# Patient Record
Sex: FEMALE | Race: BLACK OR AFRICAN AMERICAN
[De-identification: names, ages, dates, MRNs, and addresses within clinical notes are randomized per-mention and may not be internally consistent; named-entity substitution may affect disease eponyms.]

---

## 2017-01-25 ENCOUNTER — HOSPITAL ENCOUNTER (EMERGENCY)
Dept: HOSPITAL 25 - ED | Age: 22
Discharge: HOME | End: 2017-01-25
Payer: COMMERCIAL

## 2017-01-25 VITALS — DIASTOLIC BLOOD PRESSURE: 75 MMHG | SYSTOLIC BLOOD PRESSURE: 118 MMHG

## 2017-01-25 DIAGNOSIS — R10.9: ICD-10-CM

## 2017-01-25 DIAGNOSIS — R11.10: ICD-10-CM

## 2017-01-25 DIAGNOSIS — K52.9: Primary | ICD-10-CM

## 2017-01-25 DIAGNOSIS — R19.7: ICD-10-CM

## 2017-01-25 DIAGNOSIS — K59.00: ICD-10-CM

## 2017-01-25 LAB
ADD DIFF/SLIDE REVIEW?: (no result)
ALBUMIN SERPL BCG-MCNC: 4.8 G/DL (ref 3.2–5.2)
ALP SERPL-CCNC: 60 U/L (ref 34–104)
ALT SERPL W P-5'-P-CCNC: 20 U/L (ref 7–52)
ANION GAP SERPL CALC-SCNC: 6 MMOL/L (ref 2–11)
AST SERPL-CCNC: 20 U/L (ref 13–39)
BUN SERPL-MCNC: 13 MG/DL (ref 6–24)
BUN/CREAT SERPL: 21.3 (ref 8–20)
CALCIUM SERPL-MCNC: 9.8 MG/DL (ref 8.6–10.3)
CHLORIDE SERPL-SCNC: 106 MMOL/L (ref 101–111)
GLOBULIN SER CALC-MCNC: 3.1 G/DL (ref 2–4)
GLUCOSE SERPL-MCNC: 96 MG/DL (ref 70–100)
HCO3 SERPL-SCNC: 27 MMOL/L (ref 22–32)
HCT VFR BLD AUTO: 45 % (ref 35–47)
HGB BLD-MCNC: 14.6 G/DL (ref 12–16)
LIPASE SERPL-CCNC: 10 U/L (ref 11–82)
MCH RBC QN AUTO: 27 PG (ref 27–31)
MCHC RBC AUTO-ENTMCNC: 33 G/DL (ref 31–36)
MCV RBC AUTO: 83 FL (ref 80–97)
POTASSIUM SERPL-SCNC: 3.9 MMOL/L (ref 3.5–5)
PROT SERPL-MCNC: 7.9 G/DL (ref 6.4–8.9)
RBC # BLD AUTO: 5.43 10^6/UL (ref 4–5.4)
SODIUM SERPL-SCNC: 139 MMOL/L (ref 133–145)
WBC # BLD AUTO: 10.3 10^3/UL (ref 3.5–10.8)

## 2017-01-25 PROCEDURE — 96375 TX/PRO/DX INJ NEW DRUG ADDON: CPT

## 2017-01-25 PROCEDURE — 85025 COMPLETE CBC W/AUTO DIFF WBC: CPT

## 2017-01-25 PROCEDURE — 83690 ASSAY OF LIPASE: CPT

## 2017-01-25 PROCEDURE — 87086 URINE CULTURE/COLONY COUNT: CPT

## 2017-01-25 PROCEDURE — 74177 CT ABD & PELVIS W/CONTRAST: CPT

## 2017-01-25 PROCEDURE — 81015 MICROSCOPIC EXAM OF URINE: CPT

## 2017-01-25 PROCEDURE — 81003 URINALYSIS AUTO W/O SCOPE: CPT

## 2017-01-25 PROCEDURE — 99282 EMERGENCY DEPT VISIT SF MDM: CPT

## 2017-01-25 PROCEDURE — 80053 COMPREHEN METABOLIC PANEL: CPT

## 2017-01-25 PROCEDURE — 84702 CHORIONIC GONADOTROPIN TEST: CPT

## 2017-01-25 PROCEDURE — 36415 COLL VENOUS BLD VENIPUNCTURE: CPT

## 2017-01-25 PROCEDURE — 96374 THER/PROPH/DIAG INJ IV PUSH: CPT

## 2017-01-25 NOTE — RAD
CLINICAL HISTORY: Diverticulitis, nausea vomiting and diarrhea



COMPARISON: None



TECHNIQUE: Multiple contiguous axial CT scans were obtained of the abdomen and pelvis

after the administration of intravenous contrast. Coronal and sagittal multiplanar

reformations are submitted for review.  Oral contrast was not administered.  Delayed

images were obtained through the abdomen



FINDINGS:    



LUNG BASES: The lung bases are clear.



LIVER: The liver is normal in shape, size, contour, and attenuation.

BILE DUCTS: There is no intrahepatic or extrahepatic biliary dilatation.

GALLBLADDER: The gallbladder is normal, without pericholecystic inflammatory change.



PANCREAS: The pancreas is normal, without mass or ductal dilatation.

SPLEEN: Normal in size and appearance.



UPPER GI TRACT: Evaluation of the gastrointestinal tract is limited by incomplete gastric

distention. The upper GI tract is unremarkable.

SMALL BOWEL AND MESENTERY: The small bowel is normal in contour, course, and caliber.

There is no obstruction or dilatation.

COLON: The colon is normal in contour, course, caliber. There is no pericolonic

inflammatory change.



ADRENALS: Normal bilaterally.

KIDNEYS: The kidneys are normal in shape, size, contour, and axis. There is no

hydronephrosis or nephrolithiasis.

BLADDER: The bladder is smooth in contour.



PELVIC ORGANS: There is enlargement of the gonadal veins bilaterally with prominence of

the vasculature along the broad ligaments bilaterally.



AORTA: The aorta is normal.

IVC: Unremarkable



LYMPH NODES: There is no lymphadenopathy by size criteria.



ABDOMINAL WALL: There is no evidence for abdominal wall hernia.

BONES AND SOFT TISSUES: Unremarkable

OTHER: None



IMPRESSION:

1.  NO OBSTRUCTION.

2.  PROMINENCE OF THE VASCULATURE ALONG THE BROAD LIGAMENTS BILATERALLY WITH ENLARGEMENT

OF THE GONADAL VEINS. WHILE NONSPECIFIC, THIS CAN BE SEEN IN ASSOCIATION WITH PELVIC

CONGESTION SYNDROME

## 2017-01-25 NOTE — ED
Julia PORTILLO Rebecca, scribed for Lui Fountainuel on 01/25/17 at 0243 .





Abdominal Pain/Female





- HPI Summary


HPI Summary: 


Pt is a 22 y/o F who presents to ED c/o abd pain. Pain began suddenly 3 hours 

ago (2300) and has been constant since onset. Pain is diffuse without radiation

, is characterized as aching and currently ranked 4/10. Sx aggravated and 

alleviated by nothing. Additionally c/o V/D since 2300, quoting 4 episodes of 

vomiting and countless bouts of diarrhea. Additionally c/o fever, stating 

recent "hot flashes" and constipation for weeks prior to current sx. PMHx "bad 

digestive system." 








- History of Current Complaint


Chief Complaint: EDNauseaVomitDiarrh


Stated Complaint: FLU LIKE SYMPTOMS


Hx Obtained From: Patient


Hx Last Menstrual Period: LAST WEEK IN JULY 2015


Onset/Duration: Sudden Onset, Lasting Hours - 3 hours, Still Present


Timing: Constant


Severity Initially: Moderate


Severity Currently: Moderate


Pain Intensity: 4


Pain Scale Used: 0-10 Numeric


Location: Diffuse


Radiates: No


Character: Dull


Aggravating Factor(s): Nothing


Alleviating Factor(s): Nothing


Associated Signs and Symptoms: Positive: Fever, Constipation, Vomiting, Diarrhea


Allergies/Adverse Reactions: 


 Allergies











Allergy/AdvReac Type Severity Reaction Status Date / Time


 


Amoxicillin [From Augmentin] Allergy Unknown Unknown Verified 09/07/15 19:35





   Reaction  





   Details  


 


Clavulanic Acid Allergy Unknown Unknown Verified 09/07/15 19:35





[From Augmentin]   Reaction  





   Details  














PMH/Surg Hx/FS Hx/Imm Hx


Endocrine/Hematology History: 


   Denies: Hx Diabetes


Cardiovascular History: 


   Denies: Hx Hypertension


GI History: Reports: Other GI Disorders - "bad digestive system"


Infectious Disease History: No


Infectious Disease History: 


   Denies: Traveled Outside the [place] in Last 30 Days





- Family History


Known Family History: 


   Negative: Hypertension, Diabetes





- Social History


Alcohol Use: None


Substance Use Type: Reports: None


Smoking Status (MU): Never Smoked Tobacco





Review of Systems


Positive: Fever - "hot flashes"


Positive: Abdominal Pain - diffuse, Vomiting - 4x, Diarrhea, Other - 

Constipation for weeks prior to current sx 


All Other Systems Reviewed And Are Negative: Yes





Physical Exam


Triage Information Reviewed: Yes


Vital Signs On Initial Exam: 


 Initial Vitals











Temp Pulse Resp BP Pulse Ox


 


 98.3 F   92   14   118/75   100 


 


 01/25/17 02:30  01/25/17 02:30  01/25/17 02:30  01/25/17 02:30  01/25/17 02:30











Vital Signs Reviewed: Yes


Appearance: Positive: Well-Appearing, No Pain Distress


Skin: Positive: Warm, Skin Color Reflects Adequate Perfusion, Dry


Head/Face: Positive: Normal Head/Face Inspection


Eyes: Positive: EOMI, KAYY


ENT: Positive: Normal ENT inspection


Neck: Positive: Supple, Nontender


Respiratory/Lung Sounds: Positive: Clear to Auscultation, Breath Sounds Present


Cardiovascular: Positive: RRR, Pulses are Symmetrical in both Upper and Lower 

Extremities


Abdomen Description: Positive: Soft.  Negative: Nontender - Diffuse tenderness 

of the abdomen


Bowel Sounds: Positive: Present


Musculoskeletal: Positive: Normal, Strength/ROM Intact


Neurological: Positive: Normal, Sensory/Motor Intact, Alert, Oriented to Person 

Place, Time





Diagnostics





- Vital Signs


 Vital Signs











  Temp Pulse Resp BP Pulse Ox


 


 01/25/17 02:30  98.3 F  92  14  118/75  100














- Laboratory


Result Diagrams: 


 01/25/17 03:30





 01/25/17 03:30


Lab Statement: Any lab studies that have been ordered have been reviewed, and 

results considered in the medical decision making process.





- CT


  ** CT Abd/Pel


CT Interpretation: No Acute Changes - No inflammatory process identified in the 

abdomen or pelvis. No abdominal mass, adenopathy or collection seen.


CT Interpretation Completed By: Radiologist





Abdominal Pain Fem Course/Dx





- Course


Course Of Treatment: Pt is a 22 y/o F who presents to ED with a CC of V/D for 3 

hours. Additionally c/o fevers/"hot flashes" and constipation (for weeks prior 

to tonight's sx). CT Abd/Pel reveals no acute abdominal pathology. Pt will be d/

c to home with a dx of gastroenteritis and a followup with her PCP.





- Diagnoses


Provider Diagnoses: 


 Gastroenteritis








Discharge





- Discharge Plan


Condition: Stable


Disposition: HOME


Prescriptions: 


Ondansetron [Zofran Odt] 4 mg PO TID #20 tab


Patient Education Materials:  Gastroenteritis (ED)


Referrals: 


CRYS Hicks [Primary Care Provider] - 3 Days (Follow up with your primary 

care physician within the next 3 days. )





The documentation as recorded by the Julia colin Rebecca accurately 

reflects the service I personally performed and the decisions made by me, Bartolo Fountain.

## 2017-09-06 ENCOUNTER — HOSPITAL ENCOUNTER (EMERGENCY)
Dept: HOSPITAL 25 - UCCORT | Age: 22
Discharge: HOME | End: 2017-09-06
Payer: COMMERCIAL

## 2017-09-06 VITALS — DIASTOLIC BLOOD PRESSURE: 69 MMHG | SYSTOLIC BLOOD PRESSURE: 122 MMHG

## 2017-09-06 DIAGNOSIS — F32.9: ICD-10-CM

## 2017-09-06 DIAGNOSIS — Z88.3: ICD-10-CM

## 2017-09-06 DIAGNOSIS — J02.8: Primary | ICD-10-CM

## 2017-09-06 PROCEDURE — 99212 OFFICE O/P EST SF 10 MIN: CPT

## 2017-09-06 PROCEDURE — 87651 STREP A DNA AMP PROBE: CPT

## 2017-09-06 PROCEDURE — G0463 HOSPITAL OUTPT CLINIC VISIT: HCPCS

## 2017-09-06 NOTE — UC
Throat Pain/Nasal Jalen HPI





- HPI Summary


HPI Summary: 





22 y/o female presents to the urgent care c/o sore throat, nasal congestion, 

productive cough and ear popping since yesterday.  Pt states difficulty 

swallowing, pain is 7/10. She has decrease appetite with subjective fever at 

home. She has not taking anything to alleviate symptoms. Pt denies SOB, chest 

pain, N/V/D








- History of Current Complaint


Chief Complaint: UCRespiratory


Stated Complaint: CONGESTION/SORE THROAT


Time Seen by Provider: 09/06/17 21:34


Hx Obtained From: Patient


Hx Last Menstrual Period: LAST WEEK IN JULY 2015


Pregnant?: No


Onset/Duration: Gradual Onset, Lasting Days, Still Present


Severity: Moderate


Pain Intensity: 7


Pain Scale Used: 0-10 Numeric


Cough: Productive - clear phlegm


Associated Signs & Symptoms: Positive: Dysphagia, Fever - subjective at home





- Epiglottits Risk Factors


Epiglottis Risk Factors: Negative





- Allergies/Home Medications


Allergies/Adverse Reactions: 


 Allergies











Allergy/AdvReac Type Severity Reaction Status Date / Time


 


Amoxicillin [From Augmentin] Allergy Unknown Unknown Verified 09/06/17 20:29





   Reaction  





   Details  


 


Clavulanic Acid Allergy Unknown Unknown Verified 09/06/17 20:29





[From Augmentin]   Reaction  





   Details  











Home Medications: 


 Home Medications





ARIPiprazole TAB* [Abilify  TAB*] 5 mg PO DAILY 09/06/17 [History Confirmed 09/ 06/17]


Escitalopram Oxalate [Lexapro 10 mg] 10 mg PO DAILY 09/06/17 [History Confirmed 

09/06/17]











PMH/Surg Hx/FS Hx/Imm Hx


Previously Healthy: Yes


Psychological History: Depression





- Surgical History


Surgical History: None





- Family History


Known Family History: 


   Negative: Hypertension, Diabetes


Family History: Asthma





- Social History


Occupation: Employed Full-time


Lives: With Family


Alcohol Use: None


Substance Use Type: None


Smoking Status (MU): Never Smoked Tobacco


Household Exposure Type: Cigarettes





- Immunization History


Most Recent Influenza Vaccination: Not the 2014/2015 Season


Vaccination Up to Date: Yes





Review of Systems


Constitutional: Fever - subjective at home


Skin: Negative


Eyes: Negative


ENT: Sore Throat, Nasal Discharge - clear discharge


Respiratory: Cough - productive


Cardiovascular: Negative


Gastrointestinal: Negative


Genitourinary: Negative


Motor: Negative


Neurovascular: Negative


Musculoskeletal: Negative


Neurological: Negative


Psychological: Negative


Is Patient Immunocompromised?: No


All Other Systems Reviewed And Are Negative: Yes





Physical Exam


Triage Information Reviewed: Yes


Appearance: Well-Appearing, No Pain Distress, Well-Nourished


Vital Signs: 


 Initial Vital Signs











Temp  100.4 F   09/06/17 20:23


 


Pulse  87   09/06/17 20:23


 


Resp  17   09/06/17 20:23


 


BP  122/69   09/06/17 20:23


 


Pulse Ox  100   09/06/17 20:23











Vital Signs Reviewed: Yes


Eye Exam: Normal


Eyes: Positive: Conjunctiva Clear - PERRLA, EOMI


ENT: Positive: Normal ENT inspection, Hearing grossly normal, Pharyngeal 

erythema, TMs normal, Tonsillar swelling.  Negative: Nasal congestion, Nasal 

drainage, Tonsillar exudate


Neck exam: Normal


Neck: Positive: Supple, Nontender, No Lymphadenopathy


Respiratory Exam: Normal


Respiratory: Positive: Chest non-tender, Lungs clear, Normal breath sounds


Cardiovascular Exam: Normal


Cardiovascular: Positive: RRR, No Murmur, Pulses Normal


Abdominal Exam: Normal


Abdomen Description: Positive: Nontender, No Organomegaly, Soft.  Negative: CVA 

Tenderness (R), CVA Tenderness (L)


Bowel Sounds: Positive: Present


Musculoskeletal Exam: Normal


Musculoskeletal: Positive: Strength Intact, ROM Intact, No Edema


Neurological Exam: Normal


Psychological Exam: Normal


Skin Exam: Normal





Throat Pain/Nasal Course/Dx





- Course


Course Of Treatment: 22 y/o female presents to the urgent care c/o sore throat, 

nasal congestion, productive cough and ear popping since yesterday.  Pt states 

difficulty swallowing, pain is 7/10. She has decrease appetite with subjective 

fever at home. She has not taking anything to alleviate symptoms. Pt denies SOB

, chest pain, N/V/D. Rapid strep: negative. Pt with a Viral pharyngitis. Pt Rx 

ibuprofen PO to alleviates symptoms of pain and swelling. Increse fluid intake,

and rest. Advised on hand washing to avoid spreading. If symptoms do not 

improve or worsen advised to return to the urgent care or f/u with her PCP for 

further evaluation and treatment. Pt understood and agreed





- Differential Dx/Diagnosis


Differential Diagnosis/HQI/PQRI: Laryngitis, Mononucleosis, Otitis Media, 

Pharyngitis, Sinusitis, Tonsillitis


Provider Diagnoses: 1- Viral pharyngitis





Discharge





- Discharge Plan


Condition: Stable


Disposition: HOME


Prescriptions: 


Ibuprofen TAB* [Motrin TAB* 800 MG] 800 mg PO Q6H #20 tab


Patient Education Materials:  Pharyngitis (ED)


Forms:  *Work Release


Referrals: 


Josefa Figueroa MD [Primary Care Provider] - If Needed


Additional Instructions: 


1-Please take ibuprofen PO q6-8hrs prn as instructed after meals to alleviate 

pain and swelling.Increase fluid intake,eat well and rest


2-If symptoms do not improve or worsen please return to the urgent care or f/u 

with your PCP for further evaluation and treatment.

## 2017-10-04 ENCOUNTER — HOSPITAL ENCOUNTER (EMERGENCY)
Dept: HOSPITAL 25 - UCCORT | Age: 22
Discharge: HOME | End: 2017-10-04
Payer: COMMERCIAL

## 2017-10-04 VITALS — DIASTOLIC BLOOD PRESSURE: 73 MMHG | SYSTOLIC BLOOD PRESSURE: 117 MMHG

## 2017-10-04 DIAGNOSIS — H00.015: ICD-10-CM

## 2017-10-04 DIAGNOSIS — B00.9: Primary | ICD-10-CM

## 2017-10-04 DIAGNOSIS — Z88.1: ICD-10-CM

## 2017-10-04 PROCEDURE — 99212 OFFICE O/P EST SF 10 MIN: CPT

## 2017-10-04 PROCEDURE — G0463 HOSPITAL OUTPT CLINIC VISIT: HCPCS

## 2017-10-04 NOTE — UC
Skin Complaint HPI





- HPI Summary


HPI Summary: 





Patient presents to the  with CC of small lesion to the lower lip.  She 

states she has a history of herpes type II and has not had an outbreak for over 

2 years.  She thought she bit her lip and has used benadryl 50mg and ice to the 

area x 2 days without relief.  She also notes to a small red bump under the 

left lower medial lid of the eye resembling a stye.  She has been stressed 

recently and states usually she has an out break when she is stressed.  There 

is a small ulcerative lesion on the inside of the lower lip just behind the 

outer lip lesion resembling a aphthous stomatitis. She denies HA, abdominal pain

, fevers, sweats or chills. Otherwise healthy and takes no medications.  





- History of Current Complaint


Chief Complaint: UCAllergicReaction


Time Seen by Provider: 10/04/17 09:58


Stated Complaint: ALLERGIC REACTION


Hx Obtained From: Patient


Hx Last Menstrual Period: 10/2/17


Pregnant?: No


Onset/Duration: Sudden Onset


Skin Exposure Onset/Duration: Days Ago


Timing: Constant


Onset Severity: Moderate


Current Severity: Moderate


Pain Intensity: 2


Pain Scale Used: 0-10 Numeric


Location: Face


Character: Swelling, Redness, Raised


Aggravating Factor(s): Touch


Alleviating Factor(s): Cold Compresses


Associated Signs & Symptoms: Positive: Negative





- Allergy/Home Medications


Allergies/Adverse Reactions: 


 Allergies











Allergy/AdvReac Type Severity Reaction Status Date / Time


 


Amoxicillin [From Augmentin] Allergy Unknown Unknown Verified 10/04/17 09:51





   Reaction  





   Details  


 


Clavulanic Acid Allergy Unknown Unknown Verified 10/04/17 09:51





[From Augmentin]   Reaction  





   Details  











Home Medications: 


 Home Medications





diPHENhydraMINE PO* [Benadryl PO 25 MG TAB*] 50 mg PO Q6H PRN 10/04/17 [History 

Confirmed 10/04/17]


hydrOXYzine HCL TAB* [Atarax 10 MG TAB*] 10 mg PO DAILY 10/04/17 [History 

Confirmed 10/04/17]











Review of Systems


Constitutional: Negative


Skin: Other - lesion just inferior to the right lower lip with 2 small vesicles


Eyes: Negative


Respiratory: Negative


Cardiovascular: Negative


Motor: Negative


Neurovascular: Negative


Neurological: Negative


Psychological: Negative


Is Patient Immunocompromised?: No


All Other Systems Reviewed And Are Negative: Yes





PMH/Surg Hx/FS Hx/Imm Hx


Previously Healthy: Yes





- Surgical History


Surgical History: None





- Family History


Known Family History: 


   Negative: Hypertension, Diabetes


Family History: Asthma





- Social History


Occupation: Employed Part-time


Lives: With Family


Alcohol Use: None


Substance Use Type: None


Smoking Status (MU): Never Smoked Tobacco


Household Exposure Type: Cigarettes





- Immunization History


Most Recent Influenza Vaccination: Not the 2014/2015 Season


Vaccination Up to Date: Yes





Physical Exam


Triage Information Reviewed: Yes


Appearance: Well-Appearing, Well-Nourished


Vital Signs: 


 Initial Vital Signs











Temp  98.6 F   10/04/17 09:54


 


Pulse  76   10/04/17 09:54


 


Resp  18   10/04/17 09:54


 


BP  117/73   10/04/17 09:54


 


Pulse Ox  100   10/04/17 09:54











Vital Signs Reviewed: Yes


Eye Exam: Normal


Eyes: Positive: Conjunctiva Clear, Other: - small lesion to the medial left 

lower eyelid resembling a stye - painful to touch - .1cm


Neck exam: Normal


Neck: Positive: Supple, No Lymphadenopathy


Respiratory Exam: Normal


Respiratory: Positive: Chest non-tender, Lungs clear


Cardiovascular Exam: Normal


Cardiovascular: Positive: RRR


Musculoskeletal Exam: Normal


Musculoskeletal: Positive: Strength Intact


Neurological Exam: Normal


Psychological Exam: Normal


Psychological: Positive: Normal Response To Family


Skin: Positive: significant lesion(s) - lesion just inferior to the right lower 

lip





Course/Dx





- Course


Course Of Treatment: Patient is evaluated for a lesion just inferior to the 

right lower lip with 2 small vesicles and small lesion to the medial left lower 

eyelid resembling a stye - painful to touch - .1cm.  She is given Valacyclovir 

for herpes simplex of the oral cavity and parameters to try to improve the 

stye.  Will defer at this time any optho abx d/t size and recent appearance 

yesterday. Likely will improve with warm compresses.  Given instructions to 

take Lysine 1000mg daily to prevent further outbreaks, use ice and tea tree 

oil.  Given rx for abreva and valacyclovir 1000mg TID x 7 days for outbreak.  

She is OK with plan and will return for any worsening symptoms.





- Differential Diagnoses - Skin Complaint


Differential Diagnoses: Allergic Reaction, Impetigo, Urticaria





- Diagnoses


Provider Diagnoses: Herpes Simplex/ Stye





Discharge





- Discharge Plan


Condition: Stable


Disposition: HOME


Prescriptions: 


Docosanol 10%* [Abreva 10%*] 1 applic TOPICAL 5ID #1 cre


ValACYclovir (*) [Valtrex 1 GM(*)] 1 gm PO DAILY #21 tab


Patient Education Materials:  Genital Herpes Simplex (ED), Stye (ED)


Referrals: 


Josefa Figueroa MD [Primary Care Provider] - 


Additional Instructions: 


warm compresses to the eye 3-4 times per day


tea tree oil, abreva (5 x daily) - rub in very well and ice to the lip


Lysine 1000mg once daily, when having an outbreak - 6000mg once daily


Valacyclovir 1000mg three times daily for 7 days





Images


Head: 


  __________________________














  __________________________





 1 - lesion just inferior to the right lower lip





 2 - small .1 cm painful red lesion without surrounding erythema or eye 

involvement

## 2018-01-10 ENCOUNTER — HOSPITAL ENCOUNTER (EMERGENCY)
Dept: HOSPITAL 25 - UCCORT | Age: 23
Discharge: HOME | End: 2018-01-10
Payer: COMMERCIAL

## 2018-01-10 VITALS — DIASTOLIC BLOOD PRESSURE: 74 MMHG | SYSTOLIC BLOOD PRESSURE: 123 MMHG

## 2018-01-10 DIAGNOSIS — Z77.22: ICD-10-CM
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
## 2018-01-10 DIAGNOSIS — Z88.1: ICD-10-CM
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
## 2018-01-10 DIAGNOSIS — S00.521A: Primary | ICD-10-CM

## 2018-01-10 PROCEDURE — 99212 OFFICE O/P EST SF 10 MIN: CPT

## 2018-01-10 PROCEDURE — G0463 HOSPITAL OUTPT CLINIC VISIT: HCPCS

## 2018-01-10 NOTE — UC
Skin Complaint HPI





- HPI Summary


HPI Summary: 





22 year old female presents with oral lower lip blister. 





- History of Current Complaint


Time Seen by Provider: 01/10/18 16:38


Stated Complaint: LIP COMPLAINT


Hx Obtained From: Patient


Hx Last Menstrual Period: LAST WEEK IN JULY 2015


Onset/Duration: Sudden Onset


Skin Exposure Onset/Duration: Hours Ago


Onset Severity: Moderate


Current Severity: Moderate


Pain Scale Used: 0-10 Numeric - 2


Location: Discrete, Face


Aggravating Factor(s): Wind


Alleviating Factor(s): Nothing


Associated Signs & Symptoms: Positive: Negative





- Allergy/Home Medications


Allergies/Adverse Reactions: 


 Allergies











Allergy/AdvReac Type Severity Reaction Status Date / Time


 


Amoxicillin [From Augmentin] Allergy Unknown Unknown Verified 01/10/18 16:33





   Reaction  





   Details  


 


Clavulanic Acid Allergy Unknown Unknown Verified 01/10/18 16:33





[From Augmentin]   Reaction  





   Details  











Home Medications: 


 Home Medications





Nuva Ring  01/10/18 [History]











Review of Systems


Constitutional: Negative


Skin: Negative


Eyes: Negative


ENT: Other - lower lip blister


Respiratory: Negative


Cardiovascular: Negative


Gastrointestinal: Negative


Genitourinary: Negative


Motor: Negative


Neurovascular: Negative


Musculoskeletal: Negative


Neurological: Negative


Psychological: Negative


All Other Systems Reviewed And Are Negative: Yes





PMH/Surg Hx/FS Hx/Imm Hx


Previously Healthy: Yes





- Surgical History


Surgical History: None





- Family History


Known Family History: 


   Negative: Hypertension, Diabetes


Family History: Asthma





- Social History


Alcohol Use: None


Substance Use Type: None


Smoking Status (MU): Never Smoked Tobacco


Household Exposure Type: Cigarettes





- Immunization History


Most Recent Influenza Vaccination: Not the 2014/2015 Season


Vaccination Up to Date: Yes





Physical Exam


Triage Information Reviewed: Yes


Vital Signs Reviewed: Yes


Eye Exam: Normal


ENT: Positive: Other - lower lip blister


Dental Exam: Normal


Neck exam: Normal


Neck: Positive: 1


Respiratory Exam: Normal


Cardiovascular Exam: Normal


Abdominal Exam: Normal


Musculoskeletal Exam: Normal


Neurological Exam: Normal


Psychological Exam: Normal


Skin Exam: Normal





Course/Dx





- Diagnoses


Provider Diagnoses: blister lower lip





Discharge





- Discharge Plan


Condition: Stable


Disposition: HOME


Prescriptions: 


Acyclovir* [Zovirax 200 MG CAP*] 200 mg PO 5ID #35 cap


LoraTADine TAB(NF) [Claritin 10 MG TAB(NF)] 10 mg PO DAILY #30 tab


Methylprednisolone [Medrol Dosepak 4 MG*] 4 mg PO .SEE ARNIE INSTRUCTION #21 tab


Patient Education Materials:  Blister (ED)


Referrals: 


No Primary Care Phys,NOPCP [Primary Care Provider] -

## 2018-01-22 ENCOUNTER — HOSPITAL ENCOUNTER (EMERGENCY)
Dept: HOSPITAL 25 - UCCORT | Age: 23
Discharge: HOME | End: 2018-01-22
Payer: COMMERCIAL

## 2018-01-22 VITALS — SYSTOLIC BLOOD PRESSURE: 121 MMHG | DIASTOLIC BLOOD PRESSURE: 82 MMHG

## 2018-01-22 DIAGNOSIS — K64.9: ICD-10-CM

## 2018-01-22 DIAGNOSIS — K59.00: Primary | ICD-10-CM

## 2018-01-22 PROCEDURE — G0463 HOSPITAL OUTPT CLINIC VISIT: HCPCS

## 2018-01-22 PROCEDURE — 99211 OFF/OP EST MAY X REQ PHY/QHP: CPT

## 2018-01-22 NOTE — UC
Rectal Pain HPI





- HPI Summary


HPI Summary: 





Pt c/o BRBPR this morning with BM. Pt has history of hemorrhoids and 

constipation.  Pt states she has been constipated X 5 days. 





- History Of Current Complaint


Chief Complaint: UCGeneralIllness


Stated Complaint: PERSONAL


Time Seen by Provider: 01/22/18 08:41


Hx Obtained From: Patient


Hx Last Menstrual Period: 12/25/17


Pregnant?: No


Onset/Duration: Gradual Onset


Severity Initially: Mild


Severity Currently: Mild


Pain Intensity: 7


Pain Scale Used: 0-10 Numeric


Location Of Pain: Anal


Character: Sharp, Burning


Aggravating Factor(s): Bowel Movement


Alleviating Factor(s): Hemorroidal Meds - Tucks OTC


Associated Signs And Symptoms: Positive: Rectal Bleeding, Bright Red Blood w/

Stool, Constipation


Related History: Similar Episode/Dx As - hemorrhoids and constipation, 

Hemorrhoids





- Allergies/Home Medications


Allergies/Adverse Reactions: 


 Allergies











Allergy/AdvReac Type Severity Reaction Status Date / Time


 


Amoxicillin [From Augmentin] Allergy Unknown Unknown Verified 01/22/18 09:00





   Reaction  





   Details  


 


Clavulanic Acid Allergy Unknown Unknown Verified 01/22/18 09:00





[From Augmentin]   Reaction  





   Details  


 


Acyclovir Allergy  See Comment Verified 01/22/18 09:05














PMH/Surg Hx/FS Hx/Imm Hx


Previously Healthy: Yes





- Surgical History


Surgical History: None





- Family History


Known Family History: 


   Negative: Hypertension, Diabetes


Family History: Asthma





- Social History


Occupation: Employed Full-time


Lives: With Family


Alcohol Use: None


Substance Use Type: None


Smoking Status (MU): Never Smoked Tobacco


Have You Smoked in the Last Year: No


Household Exposure Type: Cigarettes





- Immunization History


Most Recent Influenza Vaccination: Not the 2014/2015 Season


Vaccination Up to Date: Yes





Review of Systems


Constitutional: Negative


Skin: Negative


Eyes: Negative


ENT: Negative


Respiratory: Negative


Cardiovascular: Negative


Gastrointestinal: Abdominal Pain, Other - constipation, BRBPM


Genitourinary: Negative


Motor: Negative


Neurovascular: Negative


Musculoskeletal: Negative


Neurological: Negative


Psychological: Negative


Is Patient Immunocompromised?: No


All Other Systems Reviewed And Are Negative: Yes





Physical Exam


Triage Information Reviewed: Yes


Appearance: Well-Appearing


Vital Signs: 


 Initial Vital Signs











Temp  99.4 F   01/22/18 09:06


 


Pulse  111   01/22/18 09:06


 


Resp  18   01/22/18 09:06


 


BP  121/82   01/22/18 09:06


 


Pulse Ox  100   01/22/18 09:06











Vital Signs Reviewed: Yes


Eye Exam: Normal


ENT Exam: Normal


Dental Exam: Normal


Neck exam: Normal


Respiratory: Positive: No respiratory distress


Abdomen Description: Positive: Other: - rectal exam, no hemorrhoids appreciated

, no fissure appreciated,


Bowel Sounds: Positive: Present


Musculoskeletal Exam: Normal


Neurological Exam: Normal


Psychological Exam: Normal


Skin Exam: Normal





Rectal Pain Course/Dx





- Course


Course Of Treatment: I discussed with the pt ways to manage her c/o of 

constirpation and possible internal hemorrhoids. I encouraged her to f/u with 

her PCP and if symptoms worsen to seek medical care at the closest ER .  Pt 

verbalized understanding and agreed to plan of care.





- Differential Dx/Diagnosis


Differential Diagnosis/HQI/PQRI: Hemorrhoid(s), Rectal Fissure, Other - 

constipation


Provider Diagnoses: constipation.  hemorrhoids





Discharge





- Discharge Plan


Condition: Stable


Disposition: HOME


Patient Education Materials:  Constipation (ED), Hemorrhoids (ED)


Forms:  *Work Release


Referrals: 


No Primary Care Phys,NOPCP [Primary Care Provider] - If Needed


Additional Instructions: 


to help alleviate your complaint of constipation and diagnoses of hemorrhoids, 

please keep well hydrated, eat a diet high in fiber and use of Over the counter 

colace to be used as directed.

## 2018-01-26 ENCOUNTER — HOSPITAL ENCOUNTER (EMERGENCY)
Dept: HOSPITAL 25 - UCCORT | Age: 23
Discharge: HOME | End: 2018-01-26
Payer: COMMERCIAL

## 2018-01-26 VITALS — DIASTOLIC BLOOD PRESSURE: 68 MMHG | SYSTOLIC BLOOD PRESSURE: 102 MMHG

## 2018-01-26 DIAGNOSIS — N39.0: Primary | ICD-10-CM

## 2018-01-26 PROCEDURE — 99212 OFFICE O/P EST SF 10 MIN: CPT

## 2018-01-26 PROCEDURE — 87086 URINE CULTURE/COLONY COUNT: CPT

## 2018-01-26 PROCEDURE — G0463 HOSPITAL OUTPT CLINIC VISIT: HCPCS

## 2018-01-26 PROCEDURE — 81003 URINALYSIS AUTO W/O SCOPE: CPT

## 2018-01-26 NOTE — UC
Complaint Female HPI





- HPI Summary


HPI Summary: 


2 DAYS DYSURIA, FREQUENCY AND URGENCY. NO FEVER, NAUSEA OR BACK PAIN. NO 

VAGINAL D/C. IS CURRENTLY ON MENSES.





- History Of Current Complaint


Chief Complaint: UCGU


Stated Complaint: URINARY


Time Seen by Provider: 01/26/18 13:11


Hx Obtained From: Patient


Hx Last Menstrual Period: 1/26/18


Onset/Duration: Sudden Onset, Lasting Days, Still Present


Timing: Constant


Severity Initially: Moderate


Severity Currently: Moderate


Pain Intensity: 4


Pain Scale Used: 0-10 Numeric


Character: Burning


Aggravating Factor(s): Urination


Alleviating Factor(s): Nothing


Associated Signs And Symptoms: Negative: Fever, Back Pain, Vaginal Discharge, 

Nausea





- Allergies/Home Medications


Allergies/Adverse Reactions: 


 Allergies











Allergy/AdvReac Type Severity Reaction Status Date / Time


 


Amoxicillin [From Augmentin] Allergy Unknown Unknown Verified 01/26/18 12:50





   Reaction  





   Details  


 


Clavulanic Acid Allergy Unknown Unknown Verified 01/26/18 12:50





[From Augmentin]   Reaction  





   Details  


 


Acyclovir Allergy  See Comment Verified 01/26/18 12:50














PMH/Surg Hx/FS Hx/Imm Hx





- Additional Past Medical History


Additional PMH: 





ALLERGIES





- Surgical History


Surgical History: None





- Family History


Known Family History: 


   Negative: Hypertension, Diabetes


Family History: Asthma





- Social History


Alcohol Use: None


Substance Use Type: None


Smoking Status (MU): Never Smoked Tobacco


Have You Smoked in the Last Year: No


Household Exposure Type: Cigarettes





- Immunization History


Most Recent Influenza Vaccination: Not the 2014/2015 Season


Most Recent Tetanus Shot: UTD


Vaccination Up to Date: Yes





Review of Systems


Constitutional: Negative


Respiratory: Negative


Cardiovascular: Negative


Gastrointestinal: Abdominal Pain


Genitourinary: Dysuria, Frequency, Urgency


All Other Systems Reviewed And Are Negative: Yes





Physical Exam


Triage Information Reviewed: Yes


Appearance: Well-Appearing, No Pain Distress, Well-Nourished


Vital Signs: 


 Initial Vital Signs











Temp  98.1 F   01/26/18 12:51


 


Pulse  80   01/26/18 12:51


 


Resp  16   01/26/18 12:51


 


BP  102/68   01/26/18 12:51


 


Pulse Ox  100   01/26/18 12:51











Vital Signs Reviewed: Yes


Eyes: Positive: Conjunctiva Clear


ENT: Positive: Hearing grossly normal


Neck: Positive: Supple


Respiratory: Positive: No respiratory distress, No accessory muscle use


Cardiovascular: Positive: Pulses Normal


Abdomen Description: Positive: Soft, Other: - SUPRAPUBIC TTP.  Negative: CVA 

Tenderness (R), CVA Tenderness (L), Distended, Guarding


Musculoskeletal: Positive: No Edema


Neurological: Positive: Alert


Psychological: Positive: Age Appropriate Behavior


Skin: Negative: rashes





Diagnostics





- Laboratory


Diagnostic Studies Completed/Ordered: URINE DIP SP. GR. 1.015, 1+ LEUKS, 2+ 

BLOOD (PT ON MENSES)





 Complaint Female Dx





- Differential Dx/Diagnosis


Provider Diagnoses: UTI





Discharge





- Discharge Plan


Condition: Stable


Disposition: HOME


Prescriptions: 


Phenazopyridine TAB* [Pyridium TAB*] 200 mg PO TID #6 tab


Sulfamethox/Trimethoprim DS* [Bactrim /160 TAB*] 1 tab PO BID #10 tab


Patient Education Materials:  Urinary Tract Infection in Women (ED)


Forms:  *Work Release


Referrals: 


April Cowan MD [Medical Doctor] - If Needed

## 2018-01-29 NOTE — UC
- Progress Note


Progress Note: 





notify pt


stop antibiotic


no UTI


recheck if still symptomatic

## 2018-05-24 ENCOUNTER — HOSPITAL ENCOUNTER (EMERGENCY)
Dept: HOSPITAL 25 - UCCORT | Age: 23
Discharge: HOME | End: 2018-05-24
Payer: SELF-PAY

## 2018-05-24 VITALS — SYSTOLIC BLOOD PRESSURE: 125 MMHG | DIASTOLIC BLOOD PRESSURE: 81 MMHG

## 2018-05-24 DIAGNOSIS — Z88.0: ICD-10-CM

## 2018-05-24 DIAGNOSIS — Z03.89: Primary | ICD-10-CM

## 2018-05-24 DIAGNOSIS — Z88.1: ICD-10-CM

## 2018-05-24 PROCEDURE — 99212 OFFICE O/P EST SF 10 MIN: CPT

## 2018-05-24 PROCEDURE — G0463 HOSPITAL OUTPT CLINIC VISIT: HCPCS

## 2018-05-24 NOTE — UC
Skin Complaint HPI





- HPI Summary


HPI Summary: 





21 yo female was out last PM fishing until 11


this am woke with s probable bite to her back


burning pain


swelling


no itch





hx of MRSA


no hx of chicken pox


no f/c











- History of Current Complaint


Chief Complaint: UCSkin


Time Seen by Provider: 05/24/18 12:36


Stated Complaint: INSECT BITE ON BACK


Hx Obtained From: Patient


Hx Last Menstrual Period: 04/25/18


Onset/Duration: Sudden Onset


Skin Exposure Onset/Duration: Hours Ago


Timing: Constant


Onset Severity: Moderate


Current Severity: Moderate


Pain Intensity: 7


Pain Scale Used: 0-10 Numeric


Location: Other - left lower back


Character: Swelling, Pain, Redness


Aggravating Factor(s): Nothing


Alleviating Factor(s): Nothing


Associated Signs & Symptoms: Positive: Tenderness


Related History: Insect Bite/Sting





- Allergy/Home Medications


Allergies/Adverse Reactions: 


 Allergies











Allergy/AdvReac Type Severity Reaction Status Date / Time


 


MS Amoxicillin Allergy Unknown Unknown Verified 01/26/18 12:50





[From Augmentin]   Reaction  





   Details  


 


MS Clavulanic Acid Allergy Unknown Unknown Verified 01/26/18 12:50





[From Augmentin]   Reaction  





   Details  


 


MS Acyclovir [Acyclovir] Allergy  See Comment Verified 01/26/18 12:50














Review of Systems


Constitutional: Negative


Skin: Negative


Eyes: Negative


ENT: Negative


Respiratory: Negative


Cardiovascular: Negative


Gastrointestinal: Negative


Genitourinary: Negative


Motor: Negative


Neurovascular: Negative


Musculoskeletal: Negative


Neurological: Negative


Psychological: Negative


Is Patient Immunocompromised?: No


All Other Systems Reviewed And Are Negative: Yes





PMH/Surg Hx/FS Hx/Imm Hx


Previously Healthy: Yes





- Surgical History


Surgical History: None





- Family History


Known Family History: 


   Negative: Hypertension, Diabetes


Family History: Asthma





- Social History


Alcohol Use: None


Substance Use Type: None


Smoking Status (MU): Never Smoked Tobacco


Have You Smoked in the Last Year: No


Household Exposure Type: Cigarettes





- Immunization History


Most Recent Influenza Vaccination: Not the 2014/2015 Season


Most Recent Tetanus Shot: UTD


Vaccination Up to Date: Yes





Physical Exam


Triage Information Reviewed: Yes


Appearance: Well-Appearing, No Pain Distress, Well-Nourished


Vital Signs: 


 Initial Vital Signs











Temp  99.1 F   05/24/18 11:17


 


Pulse  74   05/24/18 11:17


 


Resp  15   05/24/18 11:17


 


BP  125/81   05/24/18 11:17


 


Pulse Ox  100   05/24/18 11:17











Eyes: Positive: Conjunctiva Clear


ENT: Negative: Nasal drainage, TMs normal, Trismus, Muffled voice, Hoarse voice


Neck: Positive: Supple, Nontender


Respiratory: Positive: Lungs clear, Normal breath sounds, No respiratory 

distress, No accessory muscle use


Cardiovascular: Positive: RRR, No Murmur


Musculoskeletal: Positive: ROM Intact, No Edema


Neurological: Positive: Alert


Psychological Exam: Normal


Skin Exam: Other - see image





Course/Dx





- Course


Course Of Treatment: advised to recheck tomorrow if not better





- Diagnoses


Provider Diagnoses: suspect spider bite





Discharge





- Sign-Out/Discharge


Documenting (check all that apply): Discharge/Admit/Transfer





- Discharge Plan


Condition: Stable


Disposition: HOME


Prescriptions: 


Sulfamethox/Trimethoprim DS* [Bactrim /160 TAB*] 1 tab PO BID #14 tab


Patient Education Materials:  Insect Bite or Sting (ED)


Referrals: 


No Primary Care Phys,NOPCP [Primary Care Provider] - 


Additional Instructions: 


possible spider bite





given your history of MRSA with will have you start bactrim 





recheck tomorrow if worse











- Billing Disposition and Condition


Condition: STABLE


Disposition: HOME





Images


Front/Back of Body, Lg (Mono): 


  __________________________














  __________________________





 1 - ovoid/red/raised/no vesicles/not flutuant

## 2018-06-20 ENCOUNTER — HOSPITAL ENCOUNTER (EMERGENCY)
Dept: HOSPITAL 25 - UCCORT | Age: 23
Discharge: HOME | End: 2018-06-20
Payer: MEDICAID

## 2018-06-20 VITALS — SYSTOLIC BLOOD PRESSURE: 120 MMHG | DIASTOLIC BLOOD PRESSURE: 68 MMHG

## 2018-06-20 DIAGNOSIS — Z88.8: ICD-10-CM

## 2018-06-20 DIAGNOSIS — F41.8: Primary | ICD-10-CM

## 2018-06-20 DIAGNOSIS — Z88.0: ICD-10-CM

## 2018-06-20 DIAGNOSIS — Z88.3: ICD-10-CM

## 2018-06-20 PROCEDURE — G0463 HOSPITAL OUTPT CLINIC VISIT: HCPCS

## 2018-06-20 PROCEDURE — 99212 OFFICE O/P EST SF 10 MIN: CPT

## 2018-06-20 NOTE — UC
General HPI





- HPI Summary


HPI Summary: 





Pt c/o worsening anxiety symptoms of insomnia and chest tightness.  Pt "ran out

" of her abilify 2 weeks ago, is waiting to be scheduled with new PCP and 

states that anxiety symptoms are worsening.  





- History of Current Complaint


Chief Complaint: UCPsych


Stated Complaint: ANXIETY


Time Seen by Provider: 06/20/18 13:18


Hx Obtained From: Patient


Hx Last Menstrual Period: ~5/23/18


Onset/Duration: Gradual Onset, Lasting Weeks, Still Present, Worse Since - onset


Timing: Intermittent Episodes Lasting:


Onset Severity: Moderate


Current Severity: None


Pain Intensity: 0


Associated Signs & Symptoms: Positive: Chest Pain, Palpitations, Other - 

insomnia





- Allergy/Home Medications


Allergies/Adverse Reactions: 


 Allergies











Allergy/AdvReac Type Severity Reaction Status Date / Time


 


acyclovir Allergy  Vision Loss Verified 06/20/18 13:08


 


amoxicillin [From Augmentin] Allergy  Unknown Verified 06/20/18 13:08





   Reaction  





   Details  


 


clavulanic acid Allergy  Unknown Verified 06/20/18 13:08





[From Augmentin]   Reaction  





   Details  











Home Medications: 


 Home Medications





Budesonide/Formote 80/4.5(NF) [Symbicort 80/4.5 (NF)] 1 puff INH DAILY 06/20/18 

[History Confirmed 06/20/18]


Sertraline* [Zoloft*] 50 mg PO ONCE 06/20/18 [History Confirmed 06/20/18]











PMH/Surg Hx/FS Hx/Imm Hx


Previously Healthy: Yes


Psychological History: Anxiety, Depression





- Surgical History


Surgical History: None





- Family History


Known Family History: 


   Negative: Hypertension, Diabetes


Family History: Asthma





- Social History


Occupation: Employed Full-time


Lives: With Family


Alcohol Use: None


Substance Use Type: None


Smoking Status (MU): Never Smoked Tobacco


Have You Smoked in the Last Year: No


Household Exposure Type: Cigarettes





- Immunization History


Most Recent Influenza Vaccination: Not the 2014/2015 Season


Most Recent Tetanus Shot: UTD


Vaccination Up to Date: Yes





Review of Systems


Constitutional: Negative


Skin: Negative


Eyes: Negative


ENT: Negative


Respiratory: Negative


Cardiovascular: Palpitations, Chest Pain


Gastrointestinal: Negative


Genitourinary: Negative


Motor: Negative


Neurovascular: Negative


Musculoskeletal: Negative


Neurological: Negative


Psychological: Anxious


Is Patient Immunocompromised?: No


All Other Systems Reviewed And Are Negative: Yes





Physical Exam


Triage Information Reviewed: Yes


Appearance: Well-Appearing


Vital Signs: 


 Initial Vital Signs











Temp  98.7 F   06/20/18 13:04


 


Pulse  72   06/20/18 13:04


 


Resp  16   06/20/18 13:04


 


BP  120/68   06/20/18 13:04


 


Pulse Ox  100   06/20/18 13:04











Eye Exam: Normal


ENT: Positive: Hearing grossly normal


Dental Exam: Normal


Neck exam: Normal


Respiratory Exam: Normal


Cardiovascular Exam: Normal


Musculoskeletal Exam: Normal


Neurological Exam: Normal


Psychological Exam: Normal


Skin Exam: Normal





Course/Dx





- Differential Dx - Multi-Symptom


Differential Diagnoses: Other - anxiety


Provider Diagnoses: anxiety.  medication refill





Discharge





- Sign-Out/Discharge


Documenting (check all that apply): Discharge/Admit/Transfer





- Discharge Plan


Condition: Stable


Disposition: HOME


Prescriptions: 


ARIPiprazole TAB* [Abilify  TAB*] 5 mg PO DAILY #14 tab


LORazepam TAB(*) [Ativan 0.5 MG TAB (*)] 0.5 mg PO BEDTIME PRN #5 tab MDD 1


 PRN Reason: Anxiety


Patient Education Materials:  Anxiety (ED)


Forms:  *Work Release


Referrals: 


April Cowan MD [Medical Doctor] - As Soon As Possible


No Primary Care Phys,NOPCP [Primary Care Provider] - 





- Billing Disposition and Condition


Condition: STABLE


Disposition: Home

## 2018-07-07 ENCOUNTER — HOSPITAL ENCOUNTER (EMERGENCY)
Dept: HOSPITAL 25 - UCCORT | Age: 23
Discharge: HOME | End: 2018-07-07
Payer: MEDICAID

## 2018-07-07 VITALS — SYSTOLIC BLOOD PRESSURE: 128 MMHG | DIASTOLIC BLOOD PRESSURE: 85 MMHG

## 2018-07-07 DIAGNOSIS — Z88.3: ICD-10-CM

## 2018-07-07 DIAGNOSIS — O99.340: Primary | ICD-10-CM

## 2018-07-07 DIAGNOSIS — F32.9: ICD-10-CM

## 2018-07-07 DIAGNOSIS — Z3A.00: ICD-10-CM

## 2018-07-07 DIAGNOSIS — Z88.8: ICD-10-CM

## 2018-07-07 DIAGNOSIS — Z88.0: ICD-10-CM

## 2018-07-07 PROCEDURE — 84702 CHORIONIC GONADOTROPIN TEST: CPT

## 2018-07-07 PROCEDURE — 99211 OFF/OP EST MAY X REQ PHY/QHP: CPT

## 2018-07-07 PROCEDURE — G0463 HOSPITAL OUTPT CLINIC VISIT: HCPCS

## 2018-07-07 NOTE — UC
UC General HPI





- HPI Summary


HPI Summary: 





Patient has not had a cycle in 90 days, concerned fro pregnancy, requesting a 

test.





- History of Current Complaint


Chief Complaint: UCGeneralIllness


Stated Complaint: PREGNANCY TEST


Time Seen by Provider: 07/07/18 09:57


Hx Obtained From: Patient


Hx Last Menstrual Period: May


Onset/Duration: Gradual Onset, Lasting Weeks


Timing: Constant


Onset Severity: Mild


Current Severity: Mild


Pain Intensity: 0





- Allergy/Home Medications


Allergies/Adverse Reactions: 


 Allergies











Allergy/AdvReac Type Severity Reaction Status Date / Time


 


acyclovir Allergy  Vision Loss Verified 07/07/18 09:49


 


amoxicillin [From Augmentin] Allergy  Unknown Verified 07/07/18 09:49





   Reaction  





   Details  


 


clavulanic acid Allergy  Unknown Verified 07/07/18 09:49





[From Augmentin]   Reaction  





   Details  














PMH/Surg Hx/FS Hx/Imm Hx


Previously Healthy: Yes


Psychological History: Depression





- Surgical History


Surgical History: None





- Family History


Known Family History: 


   Negative: Hypertension, Diabetes


Family History: Asthma





- Social History


Alcohol Use: None


Substance Use Type: None


Smoking Status (MU): Never Smoked Tobacco


Have You Smoked in the Last Year: No


Household Exposure Type: Cigarettes





- Immunization History


Most Recent Influenza Vaccination: Not the 2014/2015 Season


Most Recent Tetanus Shot: UTD


Vaccination Up to Date: Yes





Review of Systems


Constitutional: Negative


Skin: Negative


Eyes: Negative


ENT: Negative


Respiratory: Negative


Cardiovascular: Negative


Gastrointestinal: Nausea


Genitourinary: Negative


Motor: Negative


Neurovascular: Negative


Musculoskeletal: Negative


Neurological: Negative


Psychological: Negative


Is Patient Immunocompromised?: No


All Other Systems Reviewed And Are Negative: Yes





Physical Exam


Triage Information Reviewed: Yes


Appearance: Well-Appearing, No Pain Distress, Well-Nourished


Vital Signs: 


 Initial Vital Signs











Temp  99.1 F   07/07/18 09:51


 


Pulse  102   07/07/18 09:51


 


Resp  14   07/07/18 09:51


 


BP  128/85   07/07/18 09:51


 


Pulse Ox  100   07/07/18 09:51











Vital Signs Reviewed: Yes


Eye Exam: Normal


ENT Exam: Normal


Dental Exam: Normal


Neck exam: Normal


Neck: Positive: Supple, Nontender, No Lymphadenopathy


Respiratory Exam: Normal


Respiratory: Positive: Chest non-tender, Lungs clear, Normal breath sounds


Cardiovascular Exam: Normal


Cardiovascular: Positive: RRR, No Murmur, Pulses Normal


Abdominal Exam: Normal


Abdomen Description: Positive: Nontender, No Organomegaly, Soft


Bowel Sounds: Positive: Present


Musculoskeletal Exam: Normal


Neurological Exam: Normal


Psychological Exam: Normal


Psychological: Positive: Normal Response To Family


Skin Exam: Normal





Course/Dx





- Course


Course Of Treatment: hx obtained, exam performed ,meds reviewed, Urine 

pregnancy obtained. and is positive, patient was relieved and happy. DId 

recommend calling her PCP on monday to talk about her use of abilify and Zoloft





- Differential Dx - Multi-Symptom


Provider Diagnoses: pregnancy.  depression





Discharge





- Sign-Out/Discharge


Documenting (check all that apply): Discharge/Admit/Transfer





- Discharge Plan


Condition: Stable


Disposition: HOME


Patient Education Materials:  Nausea and Vomiting in Pregnancy (ED), Pregnancy (

ED)


Referrals: 


No Primary Care Phys,NOPCP [Primary Care Provider] - 


Additional Instructions: 


1. Please call your Doctor on Monday to talk about your use of Abilify and 

Zoloft while pregnant. 


2. Increase fluid intake as tolerated and parmjit is good fo nausea.





- Billing Disposition and Condition


Condition: STABLE


Disposition: Home

## 2020-03-23 ENCOUNTER — HOSPITAL ENCOUNTER (EMERGENCY)
Dept: HOSPITAL 25 - UCCORT | Age: 25
Discharge: HOME | End: 2020-03-23
Payer: COMMERCIAL

## 2020-03-23 VITALS — DIASTOLIC BLOOD PRESSURE: 66 MMHG | SYSTOLIC BLOOD PRESSURE: 94 MMHG

## 2020-03-23 DIAGNOSIS — Z88.1: ICD-10-CM

## 2020-03-23 DIAGNOSIS — K64.9: Primary | ICD-10-CM

## 2020-03-23 DIAGNOSIS — Z88.0: ICD-10-CM

## 2020-03-23 PROCEDURE — G0463 HOSPITAL OUTPT CLINIC VISIT: HCPCS

## 2020-03-23 PROCEDURE — 99212 OFFICE O/P EST SF 10 MIN: CPT

## 2020-03-23 NOTE — UC
UC General HPI





- HPI Summary


HPI Summary: 





Patient c/o hemorroidal pain 


Has a history of hemorroids 


Has of Hx of IBS 





Diet: Trying to get better but does have issues with constipation 





Has two children, which is when it started 


Seems worse now, has not tried anything differently at home  








- History of Current Complaint


Chief Complaint: UCGI


Stated Complaint: PERSONAL


Time Seen by Provider: 03/23/20 10:37


Hx Last Menstrual Period: May


Pain Intensity: 8





- Allergy/Home Medications


Allergies/Adverse Reactions: 


 Allergies











Allergy/AdvReac Type Severity Reaction Status Date / Time


 


acyclovir Allergy  Vision Loss Verified 03/23/20 10:55


 


amoxicillin [From Augmentin] Allergy  Unknown Verified 03/23/20 10:55





   Reaction  





   Details  


 


clavulanic acid Allergy  Unknown Verified 03/23/20 10:55





[From Augmentin]   Reaction  





   Details  











Home Medications: 


 Home Medications





Hydrocortisone SUPP* [Anusol HC Supp*] 25 mg .SEE ORDER BID PRN #28 supp 03/23/ 20 [Rx]











PMH/Surg Hx/FS Hx/Imm Hx


Previously Healthy: Yes





- Surgical History


Surgical History: Yes


Surgery Procedure, Year, and Place: oral surgery-wisdom teeth





- Family History


Known Family History: 


   Negative: Hypertension, Diabetes


Family History: Asthma





- Social History


Alcohol Use: None


Substance Use Type: None


Smoking Status (MU): Never Smoked Tobacco


Have You Smoked in the Last Year: No


Household Exposure Type: Cigarettes





- Immunization History


Most Recent Influenza Vaccination: Not the 2014/2015 Season


Most Recent Tetanus Shot: UTD


Vaccination Up to Date: Yes





Review of Systems


All Other Systems Reviewed And Are Negative: Yes





Physical Exam


Triage Information Reviewed: Yes


Appearance: Well-Appearing


Vital Signs: 


 Initial Vital Signs











Temp  98.5 F   03/23/20 10:47


 


Pulse  76   03/23/20 10:47


 


Resp  18   03/23/20 10:47


 


BP  94/66   03/23/20 10:47


 


Pulse Ox  99   03/23/20 10:47











Vital Signs Reviewed: Yes


Pelvic Exam: Positive: Other - Rectal exam: 2-3 cm nonthrombosed hemorroid, no 

fissure or erythema or drainage





Course/Dx





- Course


Course Of Treatment: 





This is a 24 yr old with a hemorrhoid - non bleeding, not thrombosed 





Plan


Continue warm sitz baths 


Recommend high fiber diet, consider miralax and benefiber to help with regular 

soft stools 


Use suppository as needed as directed 


If symptoms persist or worsen, consider follow up for further evaluation





- Diagnoses


Provider Diagnosis: 


 Acute hemorrhoid








Discharge ED





- Sign-Out/Discharge


Documenting (check all that apply): Patient Departure


All imaging exams completed and their final reports reviewed: No Studies





- Discharge Plan


Condition: Good


Disposition: HOME


Prescriptions: 


Hydrocortisone SUPP* [Anusol HC Supp*] 25 mg .SEE ORDER BID PRN #28 supp


 PRN Reason: Pain - Moderate


Patient Education Materials:  Hemorrhoids (ED)


Referrals: 


Marina Martinez NP [Primary Care Provider] - 


Additional Instructions: 


Continue warm sitz baths 


Recommend high fiber diet, consider miralax and benefiber to help with regular 

soft stools 


Use suppository as needed as directed 


If symptoms persist or worsen, consider follow up for further evaluation 





- Billing Disposition and Condition


Condition: GOOD


Disposition: Home